# Patient Record
Sex: FEMALE | Race: WHITE | Employment: FULL TIME | URBAN - METROPOLITAN AREA
[De-identification: names, ages, dates, MRNs, and addresses within clinical notes are randomized per-mention and may not be internally consistent; named-entity substitution may affect disease eponyms.]

---

## 2019-09-13 ENCOUNTER — APPOINTMENT (OUTPATIENT)
Dept: GENERAL RADIOLOGY | Age: 59
End: 2019-09-13
Attending: EMERGENCY MEDICINE
Payer: COMMERCIAL

## 2019-09-13 ENCOUNTER — HOSPITAL ENCOUNTER (EMERGENCY)
Age: 59
Discharge: HOME | End: 2019-09-13
Attending: EMERGENCY MEDICINE
Payer: COMMERCIAL

## 2019-09-13 VITALS
HEART RATE: 68 BPM | OXYGEN SATURATION: 99 % | BODY MASS INDEX: 37.27 KG/M2 | RESPIRATION RATE: 11 BRPM | TEMPERATURE: 98.7 F | DIASTOLIC BLOOD PRESSURE: 89 MMHG | WEIGHT: 237.44 LBS | SYSTOLIC BLOOD PRESSURE: 160 MMHG | HEIGHT: 67 IN

## 2019-09-13 DIAGNOSIS — R07.9 ACUTE CHEST PAIN: Primary | ICD-10-CM

## 2019-09-13 LAB
ALBUMIN SERPL-MCNC: 3.7 G/DL (ref 3.5–5)
ALBUMIN/GLOB SERPL: 1 {RATIO} (ref 1.1–2.2)
ALP SERPL-CCNC: 120 U/L (ref 45–117)
ALT SERPL-CCNC: 38 U/L (ref 12–78)
ANION GAP SERPL CALC-SCNC: 12 MMOL/L (ref 5–15)
AST SERPL-CCNC: 21 U/L (ref 15–37)
BASOPHILS # BLD: 0 K/UL (ref 0–0.1)
BASOPHILS NFR BLD: 1 % (ref 0–1)
BILIRUB SERPL-MCNC: 0.3 MG/DL (ref 0.2–1)
BUN SERPL-MCNC: 10 MG/DL (ref 6–20)
BUN/CREAT SERPL: 11 (ref 12–20)
CALCIUM SERPL-MCNC: 9 MG/DL (ref 8.5–10.1)
CHLORIDE SERPL-SCNC: 105 MMOL/L (ref 97–108)
CO2 SERPL-SCNC: 24 MMOL/L (ref 21–32)
COMMENT, HOLDF: NORMAL
CREAT SERPL-MCNC: 0.89 MG/DL (ref 0.55–1.02)
DIFFERENTIAL METHOD BLD: ABNORMAL
EOSINOPHIL # BLD: 0.1 K/UL (ref 0–0.4)
EOSINOPHIL NFR BLD: 2 % (ref 0–7)
ERYTHROCYTE [DISTWIDTH] IN BLOOD BY AUTOMATED COUNT: 14 % (ref 11.5–14.5)
GLOBULIN SER CALC-MCNC: 3.8 G/DL (ref 2–4)
GLUCOSE SERPL-MCNC: 92 MG/DL (ref 65–100)
HCT VFR BLD AUTO: 38.2 % (ref 35–47)
HGB BLD-MCNC: 12.2 G/DL (ref 11.5–16)
IMM GRANULOCYTES # BLD AUTO: 0 K/UL (ref 0–0.04)
IMM GRANULOCYTES NFR BLD AUTO: 1 % (ref 0–0.5)
LYMPHOCYTES # BLD: 1.6 K/UL (ref 0.8–3.5)
LYMPHOCYTES NFR BLD: 24 % (ref 12–49)
MCH RBC QN AUTO: 27.7 PG (ref 26–34)
MCHC RBC AUTO-ENTMCNC: 31.9 G/DL (ref 30–36.5)
MCV RBC AUTO: 86.8 FL (ref 80–99)
MONOCYTES # BLD: 0.5 K/UL (ref 0–1)
MONOCYTES NFR BLD: 7 % (ref 5–13)
NEUTS SEG # BLD: 4.5 K/UL (ref 1.8–8)
NEUTS SEG NFR BLD: 65 % (ref 32–75)
NRBC # BLD: 0 K/UL (ref 0–0.01)
NRBC BLD-RTO: 0 PER 100 WBC
PLATELET # BLD AUTO: 320 K/UL (ref 150–400)
PMV BLD AUTO: 9.9 FL (ref 8.9–12.9)
POTASSIUM SERPL-SCNC: 3.7 MMOL/L (ref 3.5–5.1)
PROT SERPL-MCNC: 7.5 G/DL (ref 6.4–8.2)
RBC # BLD AUTO: 4.4 M/UL (ref 3.8–5.2)
SAMPLES BEING HELD,HOLD: NORMAL
SODIUM SERPL-SCNC: 141 MMOL/L (ref 136–145)
TROPONIN I SERPL-MCNC: <0.05 NG/ML
TROPONIN I SERPL-MCNC: <0.05 NG/ML
WBC # BLD AUTO: 6.7 K/UL (ref 3.6–11)

## 2019-09-13 PROCEDURE — 85025 COMPLETE CBC W/AUTO DIFF WBC: CPT

## 2019-09-13 PROCEDURE — 93005 ELECTROCARDIOGRAM TRACING: CPT

## 2019-09-13 PROCEDURE — 36415 COLL VENOUS BLD VENIPUNCTURE: CPT

## 2019-09-13 PROCEDURE — 71046 X-RAY EXAM CHEST 2 VIEWS: CPT

## 2019-09-13 PROCEDURE — 84484 ASSAY OF TROPONIN QUANT: CPT

## 2019-09-13 PROCEDURE — 80053 COMPREHEN METABOLIC PANEL: CPT

## 2019-09-13 PROCEDURE — 99285 EMERGENCY DEPT VISIT HI MDM: CPT

## 2019-09-13 NOTE — DISCHARGE INSTRUCTIONS
Take a baby aspirin each day. Patient Education        Chest Pain: Care Instructions  Your Care Instructions    There are many things that can cause chest pain. Some are not serious and will get better on their own in a few days. But some kinds of chest pain need more testing and treatment. Your doctor may have recommended a follow-up visit in the next 8 to 12 hours. If you are not getting better, you may need more tests or treatment. Even though your doctor has released you, you still need to watch for any problems. The doctor carefully checked you, but sometimes problems can develop later. If you have new symptoms or if your symptoms do not get better, get medical care right away. If you have worse or different chest pain or pressure that lasts more than 5 minutes or you passed out (lost consciousness), call 911 or seek other emergency help right away. A medical visit is only one step in your treatment. Even if you feel better, you still need to do what your doctor recommends, such as going to all suggested follow-up appointments and taking medicines exactly as directed. This will help you recover and help prevent future problems. How can you care for yourself at home? · Rest until you feel better. · Take your medicine exactly as prescribed. Call your doctor if you think you are having a problem with your medicine. · Do not drive after taking a prescription pain medicine. When should you call for help? Call 911 if:    · You passed out (lost consciousness).     · You have severe difficulty breathing.     · You have symptoms of a heart attack. These may include:  ? Chest pain or pressure, or a strange feeling in your chest.  ? Sweating. ? Shortness of breath. ? Nausea or vomiting. ? Pain, pressure, or a strange feeling in your back, neck, jaw, or upper belly or in one or both shoulders or arms. ? Lightheadedness or sudden weakness. ? A fast or irregular heartbeat.   After you call 911, the  may tell you to chew 1 adult-strength or 2 to 4 low-dose aspirin. Wait for an ambulance. Do not try to drive yourself.    Call your doctor today if:    · You have any trouble breathing.     · Your chest pain gets worse.     · You are dizzy or lightheaded, or you feel like you may faint.     · You are not getting better as expected.     · You are having new or different chest pain. Where can you learn more? Go to http://fabiola-landry.info/. Enter A120 in the search box to learn more about \"Chest Pain: Care Instructions. \"  Current as of: September 23, 2018  Content Version: 12.1  © 1997-5572 [employer]. Care instructions adapted under license by [employer] (which disclaims liability or warranty for this information). If you have questions about a medical condition or this instruction, always ask your healthcare professional. [address] any warranty or liability for your use of this information.

## 2019-09-13 NOTE — ED PROVIDER NOTES
HPI       61-year-old female with a prior history of GERD, hypothyroidism, kidney disease and others here with left upper chest pain. Patient states that 1005 this morning that she had a heaviness to her left upper chest area which was nonradiating. It is associated with some shortness of breath. Her pain is currently 1 out of 10 and much improved. She is never had this pain before. Was associated with sweating but no nausea or vomiting. She does does not exercise regularly. No prior cardiac work-up. She does not have a history of diabetes, hypertension or hypercholesterolemia. There is a family history of a paternal grandmother with congestive heart failure at age 76. Social history: Non-smoker. No alcohol or drug use. Past Medical History:   Diagnosis Date    Endocrine disease     hyperthyroid disease    GERD (gastroesophageal reflux disease)     Other ill-defined conditions(289.89)     kidney stone    Psychiatric disorder     depression       Past Surgical History:   Procedure Laterality Date    HX GYN      HX OTHER SURGICAL      thyroidectomy, displasia          History reviewed. No pertinent family history.     Social History     Socioeconomic History    Marital status:      Spouse name: Not on file    Number of children: Not on file    Years of education: Not on file    Highest education level: Not on file   Occupational History    Not on file   Social Needs    Financial resource strain: Not on file    Food insecurity:     Worry: Not on file     Inability: Not on file    Transportation needs:     Medical: Not on file     Non-medical: Not on file   Tobacco Use    Smoking status: Never Smoker    Smokeless tobacco: Never Used   Substance and Sexual Activity    Alcohol use: No    Drug use: No    Sexual activity: Not on file   Lifestyle    Physical activity:     Days per week: Not on file     Minutes per session: Not on file    Stress: Not on file   Relationships    Social connections:     Talks on phone: Not on file     Gets together: Not on file     Attends Muslim service: Not on file     Active member of club or organization: Not on file     Attends meetings of clubs or organizations: Not on file     Relationship status: Not on file    Intimate partner violence:     Fear of current or ex partner: Not on file     Emotionally abused: Not on file     Physically abused: Not on file     Forced sexual activity: Not on file   Other Topics Concern    Not on file   Social History Narrative    Not on file         ALLERGIES: Erythromycin    Review of Systems   Constitutional: Positive for diaphoresis. Negative for chills and fever. Respiratory: Positive for shortness of breath. Cardiovascular: Positive for chest pain. Gastrointestinal: Negative for abdominal pain, diarrhea, nausea and vomiting. All other systems reviewed and are negative. Vitals:    09/13/19 1137   BP: (!) 175/95   Pulse: 64   Resp: 14   Temp: 98.7 °F (37.1 °C)   SpO2: 99%   Weight: 107.7 kg (237 lb 7 oz)   Height: 5' 7\" (1.702 m)            Physical Exam     Nursing note and vitals reviewed. Constitutional: appears well-developed and well-nourished. No distress. HENT:   Head: Normocephalic and atraumatic. Sclera anicteric  Nose: No rhinorrhea  Mouth/Throat: Oropharynx is clear and moist. Pharynx normal  Eyes: Conjunctivae are normal. Pupils are equal, round, and reactive to light. Right eye exhibits no discharge. Left eye exhibits no discharge. No scleral icterus. Neck: Painless normal range of motion. Supple  Cardiovascular: Normal rate, regular rhythm, normal heart sounds and intact distal pulses. Exam reveals no gallop and no friction rub. No murmur heard. Pulmonary/Chest: Effort normal and breath sounds normal. No respiratory distress. no wheezes. no rales. Abdominal: Soft. Bowel sounds are normal. Exhibits no distension and no mass. No tenderness. No guarding.    Musculoskeletal: Normal range of motion. no tenderness. No edema  Lymphadenopathy:   No cervical adenopathy. Neurological:  Alert and oriented to person, place, and time. Moving all extremities. Skin: Skin is warm and dry. No rash noted. No pallor. MDM          70-year-old female here with chest pain. EKG nonischemic. No cardiac risk factors except family history with congestive heart failure. Chest pain nearly resolved. Will check chest x-ray, labs. Procedures        ED EKG interpretation:  Rhythm: normal sinus rhythm; and regular . Rate (approx.): 69; Axis: left axis deviation; P wave: normal; QRS interval: normal ; ST/T wave: normal; . This EKG was interpreted by Camila Wadsworth MD,ED Provider. ED EKG interpretation: REPEAT AT 1229  Rhythm: normal sinus rhythm; and regular . Rate (approx.): 63; Axis: left axis deviation; P wave: normal; QRS interval: normal ; ST/T wave: normal; . This EKG was interpreted by Camila Wadsworth MD,ED Provider. 3:25 PM  REMAINS CP FREE. FEELS BETTER.  2 TROPONINS ARE NEGATIVE. D/W DR. Humaira Meza, CARDIOLOGY, AND HE AGREES WITH OUTPT F/U EARLY NEXT WEEK. HE WILL SEND A NOTE TO THE OFFICE.      3:26 PM  Patient's results have been reviewed with them. Patient and/or family have verbally conveyed their understanding and agreement of the patient's signs, symptoms, diagnosis, treatment and prognosis and additionally agree to follow up as recommended or return to the Emergency Room should their condition change prior to follow-up. Discharge instructions have also been provided to the patient with some educational information regarding their diagnosis as well a list of reasons why they would want to return to the ER prior to their follow-up appointment should their condition change.     Recent Results (from the past 24 hour(s))   EKG, 12 LEAD, INITIAL    Collection Time: 09/13/19 11:37 AM   Result Value Ref Range    Ventricular Rate 69 BPM    Atrial Rate 69 BPM    P-R Interval 160 ms    QRS Duration 100 ms    Q-T Interval 414 ms    QTC Calculation (Bezet) 443 ms    Calculated R Axis -13 degrees    Calculated T Axis 22 degrees    Diagnosis       Normal sinus rhythm  Septal infarct , age undetermined  Abnormal ECG  No previous ECGs available     SAMPLES BEING HELD    Collection Time: 09/13/19 11:46 AM   Result Value Ref Range    SAMPLES BEING HELD 1LAV,1PST,1RED,1BLU     COMMENT        Add-on orders for these samples will be processed based on acceptable specimen integrity and analyte stability, which may vary by analyte. CBC WITH AUTOMATED DIFF    Collection Time: 09/13/19 11:46 AM   Result Value Ref Range    WBC 6.7 3.6 - 11.0 K/uL    RBC 4.40 3.80 - 5.20 M/uL    HGB 12.2 11.5 - 16.0 g/dL    HCT 38.2 35.0 - 47.0 %    MCV 86.8 80.0 - 99.0 FL    MCH 27.7 26.0 - 34.0 PG    MCHC 31.9 30.0 - 36.5 g/dL    RDW 14.0 11.5 - 14.5 %    PLATELET 242 045 - 458 K/uL    MPV 9.9 8.9 - 12.9 FL    NRBC 0.0 0  WBC    ABSOLUTE NRBC 0.00 0.00 - 0.01 K/uL    NEUTROPHILS 65 32 - 75 %    LYMPHOCYTES 24 12 - 49 %    MONOCYTES 7 5 - 13 %    EOSINOPHILS 2 0 - 7 %    BASOPHILS 1 0 - 1 %    IMMATURE GRANULOCYTES 1 (H) 0.0 - 0.5 %    ABS. NEUTROPHILS 4.5 1.8 - 8.0 K/UL    ABS. LYMPHOCYTES 1.6 0.8 - 3.5 K/UL    ABS. MONOCYTES 0.5 0.0 - 1.0 K/UL    ABS. EOSINOPHILS 0.1 0.0 - 0.4 K/UL    ABS. BASOPHILS 0.0 0.0 - 0.1 K/UL    ABS. IMM.  GRANS. 0.0 0.00 - 0.04 K/UL    DF AUTOMATED     METABOLIC PANEL, COMPREHENSIVE    Collection Time: 09/13/19 11:46 AM   Result Value Ref Range    Sodium 141 136 - 145 mmol/L    Potassium 3.7 3.5 - 5.1 mmol/L    Chloride 105 97 - 108 mmol/L    CO2 24 21 - 32 mmol/L    Anion gap 12 5 - 15 mmol/L    Glucose 92 65 - 100 mg/dL    BUN 10 6 - 20 MG/DL    Creatinine 0.89 0.55 - 1.02 MG/DL    BUN/Creatinine ratio 11 (L) 12 - 20      GFR est AA >60 >60 ml/min/1.73m2    GFR est non-AA >60 >60 ml/min/1.73m2    Calcium 9.0 8.5 - 10.1 MG/DL    Bilirubin, total 0.3 0.2 - 1.0 MG/DL    ALT (SGPT) 38 12 - 78 U/L    AST (SGOT) 21 15 - 37 U/L    Alk. phosphatase 120 (H) 45 - 117 U/L    Protein, total 7.5 6.4 - 8.2 g/dL    Albumin 3.7 3.5 - 5.0 g/dL    Globulin 3.8 2.0 - 4.0 g/dL    A-G Ratio 1.0 (L) 1.1 - 2.2     TROPONIN I    Collection Time: 09/13/19 11:46 AM   Result Value Ref Range    Troponin-I, Qt. <0.05 <0.05 ng/mL   EKG, 12 LEAD, INITIAL    Collection Time: 09/13/19 12:29 PM   Result Value Ref Range    Ventricular Rate 63 BPM    Atrial Rate 63 BPM    P-R Interval 158 ms    QRS Duration 96 ms    Q-T Interval 444 ms    QTC Calculation (Bezet) 454 ms    Calculated P Axis 13 degrees    Calculated R Axis -11 degrees    Calculated T Axis 14 degrees    Diagnosis       Normal sinus rhythm  Septal infarct , age undetermined  Abnormal ECG  When compared with ECG of 13-SEP-2019 11:37,  MANUAL COMPARISON REQUIRED, DATA IS UNCONFIRMED     TROPONIN I    Collection Time: 09/13/19  2:12 PM   Result Value Ref Range    Troponin-I, Qt. <0.05 <0.05 ng/mL       Xr Chest Pa Lat    Result Date: 9/13/2019  Exam:  2 view chest Indication: Chest pain. COMPARISON: None PA and lateral views demonstrate normal heart size. The patient is on a cardiac monitor. There is no acute process in the lung fields. The osseous structures are unremarkable. Impression: No acute process.

## 2019-09-13 NOTE — ED TRIAGE NOTES
Triage Note: Pt arrives via EMS from work for chest pain that started this morning while at work. EMS report NSR on monitor, 325mg of aspirin given, states that reduced pain from an 8 to and 3/10. Reports an increase in stress with her father passing recently. Pain is worse when taking a deep breath.

## 2019-09-15 LAB
ATRIAL RATE: 63 BPM
ATRIAL RATE: 69 BPM
CALCULATED P AXIS, ECG09: 13 DEGREES
CALCULATED R AXIS, ECG10: -11 DEGREES
CALCULATED R AXIS, ECG10: -13 DEGREES
CALCULATED T AXIS, ECG11: 14 DEGREES
CALCULATED T AXIS, ECG11: 22 DEGREES
DIAGNOSIS, 93000: NORMAL
DIAGNOSIS, 93000: NORMAL
P-R INTERVAL, ECG05: 158 MS
P-R INTERVAL, ECG05: 160 MS
Q-T INTERVAL, ECG07: 414 MS
Q-T INTERVAL, ECG07: 444 MS
QRS DURATION, ECG06: 100 MS
QRS DURATION, ECG06: 96 MS
QTC CALCULATION (BEZET), ECG08: 443 MS
QTC CALCULATION (BEZET), ECG08: 454 MS
VENTRICULAR RATE, ECG03: 63 BPM
VENTRICULAR RATE, ECG03: 69 BPM

## 2019-09-18 ENCOUNTER — OFFICE VISIT (OUTPATIENT)
Dept: CARDIOLOGY CLINIC | Age: 59
End: 2019-09-18

## 2019-09-18 VITALS
WEIGHT: 237.6 LBS | HEIGHT: 67 IN | SYSTOLIC BLOOD PRESSURE: 116 MMHG | DIASTOLIC BLOOD PRESSURE: 64 MMHG | BODY MASS INDEX: 37.29 KG/M2 | HEART RATE: 75 BPM | OXYGEN SATURATION: 97 % | RESPIRATION RATE: 16 BRPM

## 2019-09-18 DIAGNOSIS — R06.83 SNORING: ICD-10-CM

## 2019-09-18 DIAGNOSIS — E66.01 SEVERE OBESITY (HCC): ICD-10-CM

## 2019-09-18 DIAGNOSIS — R07.9 CHEST PAIN, UNSPECIFIED TYPE: Primary | ICD-10-CM

## 2019-09-18 PROBLEM — R07.2 PRECORDIAL PAIN: Status: ACTIVE | Noted: 2019-09-13

## 2019-09-18 PROBLEM — R53.83 OTHER FATIGUE: Status: ACTIVE | Noted: 2019-09-18

## 2019-09-18 RX ORDER — LANOLIN ALCOHOL/MO/W.PET/CERES
CREAM (GRAM) TOPICAL
COMMUNITY

## 2019-09-18 NOTE — PROGRESS NOTES
CardioVascular Consult    Patient: Waqar Lazo MRN: [id number]  SSN: [id number]    YOB: 1960  Age: 66 y.o. Sex: female       Subjective:      Primary Care Provider: Tracy English MD      Waqar Lazo is a 66 y.o.  female who presents for assessment of chest pain. This consultation was requested by Dr. Jayson Leahy from [facility]. The patient had just arrived to work on 9//13/2019 when she developed mid-sternal chest pain and shortness of breath. Symptoms were enough to make her tearful, and she called 911. Mild nausea, no vomiting. Mild dizziness. No syncope. Symptoms started at 8/10 and were 3/10 in ER. Evaluaton in ER did not show MI. Since discharge, she has had mild intermittent chest pressure. She does report feeling tired all the time. She also noted dyspnea with exertion, especially steps. 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 She also reports joint pain- by the end of the day she can barely make it to her car. She has had some grumbling in her stomach and initially thought this was her stomach; however, pain was more severe than usual. She is under a lot of stress at work. Hours have been cut and are highly variable. Last thyroid check 2 months ago. Planning for [facility]  next month for abnormal bleeding. Last EGD and colonoscopy 1 year ago- PPI increased.       Past Medical History:   Diagnosis Date    Endocrine disease     hyperthyroid disease    GERD (gastroesophageal reflux disease)     EGD and colonoscopy 2018    Other ill-defined conditions(799.89)     kidney stone    Psychiatric disorder     depression    Thyroid disease       Past Surgical History:   Procedure Laterality Date    CKC, AKA COLD KNIFE CONE  1989    HX GYN      HX OTHER SURGICAL      thyroidectomy, displasia     HX PARTIAL THYROIDECTOMY  1992     Family History   Problem Relation Age of Onset    Hypertension Mother     No Known Problems Father       Social History Tobacco Use    Smoking status: Never Smoker    Smokeless tobacco: Never Used   Substance Use Topics    Alcohol use: No      Current Outpatient Medications   Medication Sig    ferrous sulfate (IRON) 325 mg (65 mg iron) tablet Take  by mouth Daily (before breakfast).  vuwtfoem-voyd-tzp7-C-cristina-bosw (OSTEO BI-FLEX TRIPLE STRENGTH) 750 mg-644 mg- 30 mg-1 mg tab Take  by mouth. Indications: osteo bi flex    lansoprazole (PREVACID) 30 mg capsule Take 30 mg by mouth Daily (before breakfast).  FLUoxetine (PROZAC) 40 mg capsule Take 40 mg by mouth daily.  fexofenadine (ALLEGRA) 180 mg tablet Take 180 mg by mouth daily.  FLUTICASONE PROPIONATE (FLUTICASONE NA) 2 Sprays by Nasal route.  levothyroxine (SYNTHROID) 100 mcg tablet Take 100 mcg by mouth daily (before breakfast). No current facility-administered medications for this visit. Allergies   Allergen Reactions    Erythromycin Hives        Review of Symptoms:  Constitutional: No fevers or chills. HEET: No trauma. No visual changes. No hearing loss. No sore throat. Neck: No adenopathy or swelling. Sharp pain on right side. Heart: Chest pain. Lungs: No shortness of breath. No cough. Abdomen: No pain. No nausea of vomiting. No BRBPR or melena. No diarrhea. Urology: No dysuria or hematuria. Ext: No edema. Right knee swelling. Left arm pain. Skin: No acute rashes or ulcers. Endocrine: No heat or cold intolerance. Neuro: No transient neurologic deficits. Subjective:     Visit Vitals  /64 (BP 1 Location: Left arm, BP Patient Position: Sitting)   Pulse 75   Resp 16   Ht 5' 7\" (1.702 m)   Wt 237 lb 9.6 oz (107.8 kg)   SpO2 97%   BMI 37.21 kg/m²     Physical Exam:  Head: Normocephalic, atraumatic. Eyes: Pupils equal, round, reactive to light and accomodation. , Extra ocular muscles intact. Sclera anicteric. Ears: Grossly responsive to sound. Neck: No adenopathy. No bruits. Throat: No sores or erythema.   Heart: Regular rate and rhythm. Normal S1 and S2. No murmurs, gallops, or rub. Lungs: Clear to auscultation bilaterally. No wheezing, rales, or rhonchi. Abdomen: Soft, non-tender. No guarding or rebound. No hepatosplenomegaly. Bowel sounds active. Ext: No edema. No ulceration. Skin: Normal coloration. Warm and dry. No rash. Neuro: Cranial nerves II through XII intact. Motor and sensory grossly intact. Affect: Appropriate. Alert and interactive. Cardiographics:  ECG: normal EKG, normal sinus rhythm, unchanged from previous tracings         Assessment/Plan        ICD-10-CM ICD-9-CM    1. Chest pain, unspecified type R07.9 786.50 NUCLEAR CARDIAC STRESS TEST      REFERRAL TO SLEEP STUDIES      LIPID PANEL      SED RATE (ESR)      JM BY MULTIPLEX FLOW IA, QL      RHEUMATOID FACTOR, QL   2. Severe obesity (HCC) E66.01 278.01 NUCLEAR CARDIAC STRESS TEST      REFERRAL TO SLEEP STUDIES      LIPID PANEL      SED RATE (ESR)      JM BY MULTIPLEX FLOW IA, QL      RHEUMATOID FACTOR, QL   3. Snoring R06.83 786.09 NUCLEAR CARDIAC STRESS TEST      REFERRAL TO SLEEP STUDIES      LIPID PANEL      SED RATE (ESR)      JM BY MULTIPLEX FLOW IA, QL      RHEUMATOID FACTOR, QL     Ms. Sixto Carroll is a 29-year-old  female presents for assessment of chest discomfort. The patient has had chronic fatigue and dyspnea on exertion over the last 1 year. The symptoms were of sudden onset and significantly worse than hers GI symptoms prompting evaluation in the emergency room. She also has right neck and left arm discomfort of unclear significance. Fortunately she did not have acute myocardial injury during this episode; however, further assessed with a stress imaging study would be prudent given her risk factors and symptoms, as well as the need for upcoming surgery. She is also noticed significant fatigue risk for sleep apnea. We will plan on proceeding with a sleep study.   Ms. Sixto Carroll has had diffuse arthralgias and myalgias of unclear significance. I will plan on checking an ESR, JM, and RF for the possibility of inflammation arthritis. We will check a lipid panel. BMP and CBC in the emergency room were within acceptable limits, and the patient reports that recently three-part primary care. I will see the patient back to review her stress test with her. I have asked that she report any recurrent symptoms to us immediately, and to down 911 for any unstable symptoms. Voices understanding. Should her cardiac evaluation prove unremarkable she may need reassessment by gastroenterology      Stress imaging study  Sleep study  JM, RA, ESR, lipids  RTC after tests to review.       Jacob Melendez MD  9/18/2019, 10:58 AM    [facility] Office:   [facility] Office:  [address] [place]ardo [facility]  [address]     [address]  P: [phone]    P: [phone]  F: [phone]    F: [phone]

## 2019-09-18 NOTE — PROGRESS NOTES
Darryl Wasserman is a 66 y.o. female     Chief Complaint   Patient presents with    ED Follow-up     Seen at [facility] ER on 9/13/19 for chest pain       Visit Vitals  /64 (BP 1 Location: Left arm, BP Patient Position: Sitting)   Pulse 75   Resp 16   Ht 5' 7\" (1.702 m)   Wt 237 lb 9.6 oz (107.8 kg)   LMP 08/01/2019   SpO2 97%   BMI 37.21 kg/m²       Health Maintenance Due   Topic Date Due    Hepatitis C Screening  1960    DTaP/Tdap/Td series (1 - Tdap) 10/19/1983    PAP AKA CERVICAL CYTOLOGY  10/19/1983    Shingrix Vaccine Age 50> (1 of 2) 10/19/2012    BREAST CANCER SCRN MAMMOGRAM  10/19/2012    FOBT Q 1 YEAR AGE 50-75  10/19/2012    Influenza Age 9 to Adult  08/01/2019       1. Have you been to the ER, urgent care clinic since your last visit? Hospitalized since your last visit? No    2. Have you seen or consulted any other health care providers outside of the [address] [place] [address] since your last visit? Include any pap smears or colon screening.  No

## 2019-09-18 NOTE — PATIENT INSTRUCTIONS
You will be scheduled for nuclear stress testing after your appointment today. Wear comfortable clothing (shorts or pants with a shirt or blouse- no underwire bras) and walking or athletic shoes. Do not eat or drink anything, except water, for at least 2 hours prior to your appointment. Avoid tobacco products for at least 6 hours prior to your test.    Do not eat or drink anything containing caffeine, including but not limited to the following: chocolate, regular and decaffeinated coffee, soft drinks, or tea for at least 24 hours prior to your test.    Do not hold your scheduled medications prior to your test.    Your test will be performed on a 2 day protocol. For a 2 day test, please allow for 2 hours in the office each day. The radioactive isotope used for your testing is different from any of the dyes that are commonly used in x-ray procedures, and is ordered specially for your test. Please call to cancel or reschedule your appointment at least 24 hours prior to your scheduled appointment to avoid being billed for the expensive isotope. Please have your labs drawn    A referral has been sent for you to see [facility] . They will be in contact with you to scheduled an appointment. If you do not receive a call by next week please contact them at [phone].

## 2019-09-27 LAB
ANA SER QL: NEGATIVE
CHOLEST SERPL-MCNC: 196 MG/DL (ref 100–199)
ERYTHROCYTE [SEDIMENTATION RATE] IN BLOOD BY WESTERGREN METHOD: 13 MM/HR (ref 0–40)
HDLC SERPL-MCNC: 50 MG/DL
INTERPRETATION, 910389: NORMAL
LDLC SERPL CALC-MCNC: 123 MG/DL (ref 0–99)
RHEUMATOID FACT SERPL-ACNC: <10 IU/ML (ref 0–13.9)
TRIGL SERPL-MCNC: 115 MG/DL (ref 0–149)
VLDLC SERPL CALC-MCNC: 23 MG/DL (ref 5–40)

## 2019-10-23 ENCOUNTER — TELEPHONE (OUTPATIENT)
Dept: CARDIOLOGY CLINIC | Age: 59
End: 2019-10-23

## 2019-10-23 ENCOUNTER — OFFICE VISIT (OUTPATIENT)
Dept: CARDIOLOGY CLINIC | Age: 59
End: 2019-10-23

## 2019-10-23 VITALS
RESPIRATION RATE: 16 BRPM | HEIGHT: 67 IN | OXYGEN SATURATION: 99 % | HEART RATE: 77 BPM | DIASTOLIC BLOOD PRESSURE: 112 MMHG | SYSTOLIC BLOOD PRESSURE: 190 MMHG | WEIGHT: 234.2 LBS | BODY MASS INDEX: 36.76 KG/M2

## 2019-10-23 DIAGNOSIS — R07.2 PRECORDIAL PAIN: ICD-10-CM

## 2019-10-23 DIAGNOSIS — E78.5 DYSLIPIDEMIA: ICD-10-CM

## 2019-10-23 DIAGNOSIS — R10.13 DYSPEPSIA: ICD-10-CM

## 2019-10-23 DIAGNOSIS — E66.01 SEVERE OBESITY (HCC): ICD-10-CM

## 2019-10-23 DIAGNOSIS — R53.83 OTHER FATIGUE: ICD-10-CM

## 2019-10-23 DIAGNOSIS — R06.83 SNORING: Primary | ICD-10-CM

## 2019-10-23 RX ORDER — ATORVASTATIN CALCIUM 20 MG/1
TABLET, FILM COATED ORAL DAILY
COMMUNITY
End: 2019-10-23 | Stop reason: SDUPTHER

## 2019-10-23 RX ORDER — ATORVASTATIN CALCIUM 20 MG/1
20 TABLET, FILM COATED ORAL DAILY
Qty: 90 TAB | Refills: 3 | Status: SHIPPED | OUTPATIENT
Start: 2019-10-23

## 2019-10-23 NOTE — TELEPHONE ENCOUNTER
Pt returned call. 2 pt identifiers used  Wrap up instructions reviewed and pt verbalized her understanding. She reports she has already picked up her medication & B/P cuff. She reports she will be sending B/P readings in via [employer] & understands to call with any high readings or any other discomforts or concerns. Sleep referral reviewed and number given with instructions for her to call them Friday if she has not heard anything.

## 2019-10-23 NOTE — PROGRESS NOTES
Progress Note    Patient: Wilfrido Toure MRN: [id number]  SSN: [id number]    YOB: 1960  Age: 64 y.o. Sex: female        Subjective:     Ms. Apurva Bernstein is a 65 yo WF with chest pain. The patient presented to the ER 9/13/2019 with chest pain and shortness of breath. She has also noted joint pain and progressive fatigue. Feels better today. Rare chest tightness, but no severe symptoms like in ER. Continues with dyspnea on exertion. Still with fatigue. Thinks blood pressure elevated due to stress driving this morning. Also taking cold medicine last few days- Mucinex DM. Objective:     Vitals:    10/23/19 0852 10/23/19 0907 10/23/19 0931 10/23/19 0936   BP: (!) 190/98 170/90 (!) 190/115 (!) 190/112   Pulse: 76   77   Resp: 16      SpO2: 99%   99%   Weight: 234 lb 3.2 oz (106.2 kg)      Height: 5' 7\" (1.702 m)           Physical Exam:   Head: Normocephalic, atraumatic. Eyes: Pupils equal, round, reactive to light and accomodation, Extra ocular muscles intact. Sclera anicteric. Ears: Grossly responsive to sound. Neck: No adenopathy. No bruits. Throat: No sores or erythema. Heart: Regular rate and rhythm. Normal S1 and S2. No murmurs, gallops, or rub. Lungs: Clear to auscultation bilaterally. No wheezing, rales, or rhonchi. Abdomen: Soft, non-tender. No guarding or rebound. No hepatosplenomegaly. Bowel sounds active. Ext: No edema. No ulceration. Skin: Normal coloration. Warm and dry. No rash. Neuro: Cranial nerves II through XII intact. Motor and sensory grossly intact. Affect: Appropriate. Alert and interactive.           Lab Results   Component Value Date/Time    Sed rate (ESR) 13 09/26/2019 08:11 AM   JM=negative  RF= <10    Lab Results   Component Value Date/Time    Cholesterol, total 196 09/26/2019 08:11 AM    HDL Cholesterol 50 09/26/2019 08:11 AM    LDL, calculated 123 (H) 09/26/2019 08:11 AM    VLDL, calculated 23 09/26/2019 08:11 AM    Triglyceride 115 09/26/2019 08:11 AM Lab Results   Component Value Date/Time    Sodium 141 09/13/2019 11:46 AM    Potassium 3.7 09/13/2019 11:46 AM    Chloride 105 09/13/2019 11:46 AM    CO2 24 09/13/2019 11:46 AM    Anion gap 12 09/13/2019 11:46 AM    Glucose 92 09/13/2019 11:46 AM    BUN 10 09/13/2019 11:46 AM    Creatinine 0.89 09/13/2019 11:46 AM    BUN/Creatinine ratio 11 (L) 09/13/2019 11:46 AM    GFR est AA >60 09/13/2019 11:46 AM    GFR est non-AA >60 09/13/2019 11:46 AM    Calcium 9.0 09/13/2019 11:46 AM    Bilirubin, total 0.3 09/13/2019 11:46 AM    AST (SGOT) 21 09/13/2019 11:46 AM    Alk. phosphatase 120 (H) 09/13/2019 11:46 AM    Protein, total 7.5 09/13/2019 11:46 AM    Albumin 3.7 09/13/2019 11:46 AM    Globulin 3.8 09/13/2019 11:46 AM    A-G Ratio 1.0 (L) 09/13/2019 11:46 AM    ALT (SGPT) 38 09/13/2019 11:46 AM     Lab Results   Component Value Date/Time    WBC 6.7 09/13/2019 11:46 AM    HGB 12.2 09/13/2019 11:46 AM    HCT 38.2 09/13/2019 11:46 AM    PLATELET 683 70/72/3883 11:46 AM    MCV 86.8 09/13/2019 11:46 AM       Nuclear stress 10/17/19: no ischemia or infarction. Normal EF. Assessment/Plan:       ICD-10-CM ICD-9-CM    1. Snoring R06.83 786.09 SLEEP MEDICINE REFERRAL      METABOLIC PANEL, COMPREHENSIVE      LIPID PANEL   2. Severe obesity ([address] E66.01 278.01 SLEEP MEDICINE REFERRAL      METABOLIC PANEL, COMPREHENSIVE      LIPID PANEL   3. Precordial pain R07.2 786.51    4. Dyslipidemia E78.5 272.4    5. Other fatigue R53.83 780.79    6. Dyspepsia R10.13 536.8      Chest pain of unclear etiology. Improving, but still present. Low risk stress test, will pursue a conservative course for now. Elevated LDL- start statin. Lipitor 20 mg. Check L/L 6 weeks. Hold on aspirin due to need for surgery, low risk stress test, and active GI issues. Consider at age 74. No signs of inflammatory arthritis. GI symptoms may be a factor. Increase Prevacid 30 BID for 2 weeks, then back to once per day and reassess.  Consider GI evaluation of symptoms persist.    Blood pressure elevated- will follow for now. Check BP and HR twice a day for next two weeks at home. Stop cold medication. Proceed with sleep study as many of her symptoms may be caused by this. Average cardiovascular risk for upcoming surgery (D&C) with Dr. Ministerio Ocasio  ([phone]). Ok to proceed.     RTC 2 months      Signed By: Mook Callahan MD     October 23, 2019

## 2019-10-23 NOTE — PATIENT INSTRUCTIONS
Start taking Lipitor 20 mg. Every evening. Increase Pravacid to twice a day for two weeks and then return to once a day. If symptoms persist you will need to see a gastroenterologist.  Do CMP & Lipid labs in 2 weeks. Do blood pressure & pulse twice a day for next two weeks and call or send in readings. If any high readings please call office. Do not take any cold medications that have or end with DM as this can raise your blood pressure. If you do not hear from sleep study office by Friday, please call their office to schedule appointment using number provided.

## 2020-03-21 ENCOUNTER — APPOINTMENT (OUTPATIENT)
Dept: CT IMAGING | Age: 60
End: 2020-03-21
Attending: EMERGENCY MEDICINE
Payer: COMMERCIAL

## 2020-03-21 ENCOUNTER — HOSPITAL ENCOUNTER (EMERGENCY)
Age: 60
Discharge: HOME | End: 2020-03-21
Attending: EMERGENCY MEDICINE
Payer: COMMERCIAL

## 2020-03-21 VITALS
RESPIRATION RATE: 22 BRPM | WEIGHT: 232.14 LBS | HEART RATE: 73 BPM | BODY MASS INDEX: 36.44 KG/M2 | HEIGHT: 67 IN | DIASTOLIC BLOOD PRESSURE: 84 MMHG | SYSTOLIC BLOOD PRESSURE: 162 MMHG | TEMPERATURE: 97.5 F | OXYGEN SATURATION: 98 %

## 2020-03-21 DIAGNOSIS — N13.1 HYDRONEPHROSIS DUE TO OBSTRUCTION OF URETER: ICD-10-CM

## 2020-03-21 DIAGNOSIS — N20.0 LEFT RENAL STONE: Primary | ICD-10-CM

## 2020-03-21 DIAGNOSIS — R10.31 ABDOMINAL PAIN, RIGHT LOWER QUADRANT: ICD-10-CM

## 2020-03-21 LAB
ALBUMIN SERPL-MCNC: 4 G/DL (ref 3.5–5)
ALBUMIN/GLOB SERPL: 1 {RATIO} (ref 1.1–2.2)
ALP SERPL-CCNC: 140 U/L (ref 45–117)
ALT SERPL-CCNC: 29 U/L (ref 12–78)
ANION GAP SERPL CALC-SCNC: 4 MMOL/L (ref 5–15)
APPEARANCE UR: ABNORMAL
AST SERPL-CCNC: 16 U/L (ref 15–37)
BACTERIA URNS QL MICRO: NEGATIVE /HPF
BASOPHILS # BLD: 0.1 K/UL (ref 0–0.1)
BASOPHILS NFR BLD: 1 % (ref 0–1)
BILIRUB SERPL-MCNC: 0.3 MG/DL (ref 0.2–1)
BILIRUB UR QL CFM: NEGATIVE
BUN SERPL-MCNC: 13 MG/DL (ref 6–20)
BUN/CREAT SERPL: 13 (ref 12–20)
CALCIUM SERPL-MCNC: 8.9 MG/DL (ref 8.5–10.1)
CHLORIDE SERPL-SCNC: 105 MMOL/L (ref 97–108)
CO2 SERPL-SCNC: 28 MMOL/L (ref 21–32)
COLOR UR: ABNORMAL
CREAT SERPL-MCNC: 1 MG/DL (ref 0.55–1.02)
DIFFERENTIAL METHOD BLD: ABNORMAL
EOSINOPHIL # BLD: 0.2 K/UL (ref 0–0.4)
EOSINOPHIL NFR BLD: 1 % (ref 0–7)
EPITH CASTS URNS QL MICRO: ABNORMAL /LPF
ERYTHROCYTE [DISTWIDTH] IN BLOOD BY AUTOMATED COUNT: 13.9 % (ref 11.5–14.5)
GLOBULIN SER CALC-MCNC: 4 G/DL (ref 2–4)
GLUCOSE SERPL-MCNC: 97 MG/DL (ref 65–100)
GLUCOSE UR STRIP.AUTO-MCNC: NEGATIVE MG/DL
HCT VFR BLD AUTO: 40.5 % (ref 35–47)
HGB BLD-MCNC: 13.1 G/DL (ref 11.5–16)
HGB UR QL STRIP: ABNORMAL
HYALINE CASTS URNS QL MICRO: ABNORMAL /LPF (ref 0–5)
IMM GRANULOCYTES # BLD AUTO: 0.1 K/UL (ref 0–0.04)
IMM GRANULOCYTES NFR BLD AUTO: 1 % (ref 0–0.5)
KETONES UR QL STRIP.AUTO: NEGATIVE MG/DL
LEUKOCYTE ESTERASE UR QL STRIP.AUTO: ABNORMAL
LYMPHOCYTES # BLD: 3 K/UL (ref 0.8–3.5)
LYMPHOCYTES NFR BLD: 27 % (ref 12–49)
MCH RBC QN AUTO: 27.1 PG (ref 26–34)
MCHC RBC AUTO-ENTMCNC: 32.3 G/DL (ref 30–36.5)
MCV RBC AUTO: 83.9 FL (ref 80–99)
MONOCYTES # BLD: 0.8 K/UL (ref 0–1)
MONOCYTES NFR BLD: 8 % (ref 5–13)
NEUTS SEG # BLD: 6.9 K/UL (ref 1.8–8)
NEUTS SEG NFR BLD: 62 % (ref 32–75)
NITRITE UR QL STRIP.AUTO: NEGATIVE
NRBC # BLD: 0 K/UL (ref 0–0.01)
NRBC BLD-RTO: 0 PER 100 WBC
PH UR STRIP: 6 [PH] (ref 5–8)
PLATELET # BLD AUTO: 334 K/UL (ref 150–400)
PMV BLD AUTO: 10.3 FL (ref 8.9–12.9)
POTASSIUM SERPL-SCNC: 3.6 MMOL/L (ref 3.5–5.1)
PROT SERPL-MCNC: 8 G/DL (ref 6.4–8.2)
PROT UR STRIP-MCNC: NEGATIVE MG/DL
RBC # BLD AUTO: 4.83 M/UL (ref 3.8–5.2)
RBC #/AREA URNS HPF: >100 /HPF (ref 0–5)
SODIUM SERPL-SCNC: 137 MMOL/L (ref 136–145)
SP GR UR REFRACTOMETRY: 1.02 (ref 1–1.03)
UA: UC IF INDICATED,UAUC: ABNORMAL
UROBILINOGEN UR QL STRIP.AUTO: 0.2 EU/DL (ref 0.2–1)
WBC # BLD AUTO: 11 K/UL (ref 3.6–11)
WBC URNS QL MICRO: ABNORMAL /HPF (ref 0–4)

## 2020-03-21 PROCEDURE — 96375 TX/PRO/DX INJ NEW DRUG ADDON: CPT

## 2020-03-21 PROCEDURE — 74176 CT ABD & PELVIS W/O CONTRAST: CPT

## 2020-03-21 PROCEDURE — 99284 EMERGENCY DEPT VISIT MOD MDM: CPT

## 2020-03-21 PROCEDURE — 96374 THER/PROPH/DIAG INJ IV PUSH: CPT

## 2020-03-21 PROCEDURE — 87086 URINE CULTURE/COLONY COUNT: CPT

## 2020-03-21 PROCEDURE — 74011250637 HC RX REV CODE- 250/637: Performed by: EMERGENCY MEDICINE

## 2020-03-21 PROCEDURE — 74011250636 HC RX REV CODE- 250/636: Performed by: EMERGENCY MEDICINE

## 2020-03-21 PROCEDURE — 36415 COLL VENOUS BLD VENIPUNCTURE: CPT

## 2020-03-21 PROCEDURE — 81001 URINALYSIS AUTO W/SCOPE: CPT

## 2020-03-21 PROCEDURE — 80053 COMPREHEN METABOLIC PANEL: CPT

## 2020-03-21 PROCEDURE — 85025 COMPLETE CBC W/AUTO DIFF WBC: CPT

## 2020-03-21 RX ORDER — OXYCODONE HYDROCHLORIDE 5 MG/1
5 TABLET ORAL
Qty: 12 TAB | Refills: 0 | Status: SHIPPED | OUTPATIENT
Start: 2020-03-21 | End: 2020-03-24

## 2020-03-21 RX ORDER — KETOROLAC TROMETHAMINE 10 MG/1
10 TABLET, FILM COATED ORAL
Qty: 20 TAB | Refills: 0 | Status: SHIPPED | OUTPATIENT
Start: 2020-03-21 | End: 2020-03-26

## 2020-03-21 RX ORDER — KETOROLAC TROMETHAMINE 30 MG/ML
15 INJECTION, SOLUTION INTRAMUSCULAR; INTRAVENOUS ONCE
Status: COMPLETED | OUTPATIENT
Start: 2020-03-21 | End: 2020-03-21

## 2020-03-21 RX ORDER — TAMSULOSIN HYDROCHLORIDE 0.4 MG/1
0.4 CAPSULE ORAL DAILY
Qty: 15 CAP | Refills: 0 | Status: SHIPPED | OUTPATIENT
Start: 2020-03-21 | End: 2020-04-05

## 2020-03-21 RX ORDER — ACETAMINOPHEN 500 MG
1000 TABLET ORAL ONCE
Status: COMPLETED | OUTPATIENT
Start: 2020-03-21 | End: 2020-03-21

## 2020-03-21 RX ORDER — ONDANSETRON 2 MG/ML
4 INJECTION INTRAMUSCULAR; INTRAVENOUS
Status: DISCONTINUED | OUTPATIENT
Start: 2020-03-21 | End: 2020-03-22 | Stop reason: HOSPADM

## 2020-03-21 RX ADMIN — KETOROLAC TROMETHAMINE 15 MG: 30 INJECTION, SOLUTION INTRAMUSCULAR at 21:32

## 2020-03-21 RX ADMIN — SODIUM CHLORIDE 1000 ML: 900 INJECTION, SOLUTION INTRAVENOUS at 21:32

## 2020-03-21 RX ADMIN — ONDANSETRON 4 MG: 2 INJECTION INTRAMUSCULAR; INTRAVENOUS at 21:31

## 2020-03-21 RX ADMIN — ACETAMINOPHEN 1000 MG: 500 TABLET ORAL at 21:32

## 2020-03-22 NOTE — ED PROVIDER NOTES
EMERGENCY DEPARTMENT HISTORY AND PHYSICAL EXAM      Date: 3/21/2020  Patient Name: Gurjit Li    History of Presenting Illness     Chief Complaint   Patient presents with    Abdominal Pain     lower right abdominal pain and flank pain started about 5pm tonight patient has a history of kidney stones denies blood in her urine and denies difficulty urinating        History Provided By: Patient    HPI: Gurjit Li, 64 y.o. female  With previous medical history of kidney stones, hypothyroidism presenting today with right-sided flank pain. The patient reports that she started having pain a couple of hours ago. She was trying to wait it out at home, but her pain became more severe. She states that it started in the right lower quadrant of her abdomen and then radiates around to the right flank area. She notes that she has had a history of kidney stones and this feels similar. She is been in her usual state of health for the past several days. She denies any fevers, chills, dysuria, hematuria, or abdominal surgery. Exacerbating or alleviating factors. There are no other complaints, changes, or physical findings at this time. PCP: Burak Cespedes MD    No current facility-administered medications on file prior to encounter. Current Outpatient Medications on File Prior to Encounter   Medication Sig Dispense Refill    atorvastatin (LIPITOR) 20 mg tablet Take 1 Tab by mouth daily. 90 Tab 3    ferrous sulfate (IRON) 325 mg (65 mg iron) tablet Take  by mouth Daily (before breakfast).  rinnkxls-nche-thp0-C-cristina-bosw (OSTEO BI-FLEX TRIPLE STRENGTH) 750 mg-644 mg- 30 mg-1 mg tab Take  by mouth. Indications: osteo bi flex      lansoprazole (PREVACID) 30 mg capsule Take 30 mg by mouth two (2) times a day. Twice a day for 2 weeks      FLUoxetine (PROZAC) 40 mg capsule Take 40 mg by mouth daily.  fexofenadine (ALLEGRA) 180 mg tablet Take 180 mg by mouth daily.       FLUTICASONE PROPIONATE (FLUTICASONE NA) 2 Sprays by Nasal route.  levothyroxine (SYNTHROID) 100 mcg tablet Take 100 mcg by mouth daily (before breakfast). Past History     Past Medical History:  Past Medical History:   Diagnosis Date    Endocrine disease     hyperthyroid disease    GERD (gastroesophageal reflux disease)     EGD and colonoscopy 2018    Other ill-defined conditions(799.89)     kidney stone    Psychiatric disorder     depression    Thyroid disease        Past Surgical History:  Past Surgical History:   Procedure Laterality Date    CKC, AKA COLD KNIFE CONE  1989    HX GYN      HX OTHER SURGICAL      thyroidectomy, displasia     HX PARTIAL THYROIDECTOMY  1992       Family History:  Family History   Problem Relation Age of Onset    Hypertension Mother     No Known Problems Father        Social History:  Social History     Tobacco Use    Smoking status: Never Smoker    Smokeless tobacco: Never Used   Substance Use Topics    Alcohol use: No    Drug use: No       Allergies: Allergies   Allergen Reactions    Erythromycin Hives         Review of Systems   Constitutional: No  fever  Skin: No  rash  HEENT: No  nasal congestion  Resp: No cough  CV: No chest pain  GI: No vomiting  : No dysuria  MSK: No joint pain  Neuro: No numbness  Psych: No suicidal      Physical Exam     Patient Vitals for the past 12 hrs:   Temp Pulse Resp BP SpO2   03/21/20 2200  73 22 162/84 98 %   03/21/20 2145  (!) 55 20 154/77 98 %   03/21/20 2100  61 16 185/82 (!) 75 %   03/21/20 2045 97.5 °F (36.4 °C) 65 18 (!) 184/103 100 %     General: alert, mild distress, appears in pain  Eyes: EOMI, normal conjunctiva  ENT: moist mucous membranes. Neck: Active, full ROM of neck. Skin: No rashes. no jaundice              Lungs: Equal chest expansion. no respiratory distress.  clear to auscultation bilaterally No accessory muscle usage  Heart: regular rate     no peripheral edema   2+ radial pulses and DPs bilaterally  Abd:  non distended soft, Tender in the RLQ. No rebound tenderness. No guarding  Back: Full ROM  MSK: Full, active ROM in all 4 extremities. Neuro: Person, Place, Time and Situation; normal speech;   Psych: Cooperative with exam; Appropriate mood and affect             Diagnostic Study Results     Labs -     Recent Results (from the past 12 hour(s))   METABOLIC PANEL, COMPREHENSIVE    Collection Time: 03/21/20  9:04 PM   Result Value Ref Range    Sodium 137 136 - 145 mmol/L    Potassium 3.6 3.5 - 5.1 mmol/L    Chloride 105 97 - 108 mmol/L    CO2 28 21 - 32 mmol/L    Anion gap 4 (L) 5 - 15 mmol/L    Glucose 97 65 - 100 mg/dL    BUN 13 6 - 20 MG/DL    Creatinine 1.00 0.55 - 1.02 MG/DL    BUN/Creatinine ratio 13 12 - 20      GFR est AA >60 >60 ml/min/1.73m2    GFR est non-AA 57 (L) >60 ml/min/1.73m2    Calcium 8.9 8.5 - 10.1 MG/DL    Bilirubin, total 0.3 0.2 - 1.0 MG/DL    ALT (SGPT) 29 12 - 78 U/L    AST (SGOT) 16 15 - 37 U/L    Alk. phosphatase 140 (H) 45 - 117 U/L    Protein, total 8.0 6.4 - 8.2 g/dL    Albumin 4.0 3.5 - 5.0 g/dL    Globulin 4.0 2.0 - 4.0 g/dL    A-G Ratio 1.0 (L) 1.1 - 2.2     CBC WITH AUTOMATED DIFF    Collection Time: 03/21/20  9:04 PM   Result Value Ref Range    WBC 11.0 3.6 - 11.0 K/uL    RBC 4.83 3.80 - 5.20 M/uL    HGB 13.1 11.5 - 16.0 g/dL    HCT 40.5 35.0 - 47.0 %    MCV 83.9 80.0 - 99.0 FL    MCH 27.1 26.0 - 34.0 PG    MCHC 32.3 30.0 - 36.5 g/dL    RDW 13.9 11.5 - 14.5 %    PLATELET 076 777 - 444 K/uL    MPV 10.3 8.9 - 12.9 FL    NRBC 0.0 0  WBC    ABSOLUTE NRBC 0.00 0.00 - 0.01 K/uL    NEUTROPHILS 62 32 - 75 %    LYMPHOCYTES 27 12 - 49 %    MONOCYTES 8 5 - 13 %    EOSINOPHILS 1 0 - 7 %    BASOPHILS 1 0 - 1 %    IMMATURE GRANULOCYTES 1 (H) 0.0 - 0.5 %    ABS. NEUTROPHILS 6.9 1.8 - 8.0 K/UL    ABS. LYMPHOCYTES 3.0 0.8 - 3.5 K/UL    ABS. MONOCYTES 0.8 0.0 - 1.0 K/UL    ABS. EOSINOPHILS 0.2 0.0 - 0.4 K/UL    ABS. BASOPHILS 0.1 0.0 - 0.1 K/UL    ABS. IMM.  GRANS. 0.1 (H) 0.00 - 0.04 K/UL    DF AUTOMATED URINALYSIS W/ REFLEX CULTURE    Collection Time: 03/21/20  9:04 PM   Result Value Ref Range    Color YELLOW/STRAW      Appearance CLOUDY (A) CLEAR      Specific gravity 1.020 1.003 - 1.030      pH (UA) 6.0 5.0 - 8.0      Protein NEGATIVE  NEG mg/dL    Glucose NEGATIVE  NEG mg/dL    Ketone NEGATIVE  NEG mg/dL    Blood LARGE (A) NEG      Urobilinogen 0.2 0.2 - 1.0 EU/dL    Nitrites NEGATIVE  NEG      Leukocyte Esterase SMALL (A) NEG      WBC 5-10 0 - 4 /hpf    RBC >100 (H) 0 - 5 /hpf    Epithelial cells FEW FEW /lpf    Bacteria NEGATIVE  NEG /hpf    UA:UC IF INDICATED URINE CULTURE ORDERED (A) CNI      Hyaline cast 2-5 0 - 5 /lpf   BILIRUBIN, CONFIRM    Collection Time: 03/21/20  9:04 PM   Result Value Ref Range    Bilirubin UA, confirm NEGATIVE  NEG         Radiologic Studies -   CT ABD PELV WO CONT   Final Result   IMPRESSION:      1. 5 mm distal right ureteral calculus causes mild proximal obstructive   uropathy. Its progress can be followed with KUB if clinically indicated. 2. 2.7 cm left ovarian cyst is nonspecific but new since 2013. Recommend pelvic   ultrasound in 1 month. 3. Decreased hepatic steatosis on noncontrast CT. Lateral right hepatic lobe 1.4   cm lesion is new and nonspecific. Recommend nonemergent dedicated hepatic MRI or   CT. CT Results  (Last 48 hours)               03/21/20 2129  CT ABD PELV WO CONT Final result    Impression:  IMPRESSION:       1. 5 mm distal right ureteral calculus causes mild proximal obstructive   uropathy. Its progress can be followed with KUB if clinically indicated. 2. 2.7 cm left ovarian cyst is nonspecific but new since 2013. Recommend pelvic   ultrasound in 1 month. 3. Decreased hepatic steatosis on noncontrast CT. Lateral right hepatic lobe 1.4   cm lesion is new and nonspecific. Recommend nonemergent dedicated hepatic MRI or   CT.        Narrative:  EXAM: CT ABD PELV WO CONT       INDICATION: Right abdominal pain and flank pain started at 1700 hours today.   Previous nephrolithiasis. GERD. Nonspecific oncologic surgery. Hematuria on   urinalysis. COMPARISON: CT abdomen/pelvis on 12/30/2013       CONTRAST:  None. TECHNIQUE:    Thin axial images were obtained through the abdomen and pelvis. Coronal and   sagittal reconstructions were generated. Oral contrast was not administered. CT   dose reduction was achieved through use of a standardized protocol tailored for   this examination and automatic exposure control for dose modulation. The absence of intravenous contrast material reduces the sensitivity for   evaluation of the solid parenchymal organs of the abdomen. FINDINGS:    LUNG BASES: Clear. INCIDENTALLY IMAGED HEART AND MEDIASTINUM: Unremarkable. LIVER: Hypoattenuation in the medial segment 7 of the liver is unchanged. 1.4 cm   Hypoattenuation in lateral segment 7 of the liver is new and nonspecific. Hepatic steatosis is decreased on CT. GALLBLADDER: Unremarkable. SPLEEN: Upper normal size. PANCREAS: No inflammation. ADRENALS: Unremarkable. KIDNEYS/URETERS: 5 mm calculus in the distal right ureter is within a few   centimeters of the UVJ and visible on the  view inferior to the right   sacroiliac joint. Mild right hydroureter and hydronephrosis. 1 and 2 mm calculi in the right kidney. No left nephrolithiasis or   hydronephrosis. STOMACH: Incomplete distention, limited evaluation. SMALL BOWEL: No dilatation or wall thickening. COLON: No dilatation or wall thickening. APPENDIX: Unremarkable. PERITONEUM: No ascites or pneumoperitoneum. RETROPERITONEUM: No lymphadenopathy or aortic aneurysm. REPRODUCTIVE ORGANS: Uterus is not enlarged. Left ovarian cysts measures 2.7 cm,   new since 2013. URINARY BLADDER: Incomplete distention, limited evaluation. BONES: Lumbar spine degenerative disc disease includes multilevel grade 1   retrolistheses. No aggressive bone lesion. ADDITIONAL COMMENTS: Obesity. No hernia. CXR Results  (Last 48 hours)    None          Medical Decision Making   I am the first provider for this patient. I reviewed the vital signs, available nursing notes, past medical history, past surgical history, family history and social history. Records Reviewed: Patient last had visit for kidney stone in our emergency department in 12/5/2011    Provider Notes (Medical Decision Making):     Differential Diagnosis: Renal stone, urinary tract infection, pyelonephritis, electrolyte arrangement    Initial Plan: We will treat the patient with IV Toradol, Tylenol, obtain a CT without contrast of the abdomen, urinalysis, and labs and reassess. The patient certainly does appear uncomfortable, but is in only mild distress. She is hypertensive without other abnormal vital signs. ED Course:   Initial assessment performed. The patients presenting problems have been discussed, and they are in agreement with the care plan formulated and outlined with them. I have encouraged them to ask questions as they arise throughout their visit. ED Course as of Mar 21 2232   Sat Mar 21, 2020   2207 On my interpretation of the patient's laboratory studies unremarkable electrolytes, normal creatinine, urinalysis with no evidence of infection, CBC is unremarkable as well. [NW]   2207 On my interpretation of the patient's CT there is evidence of a left-sided ureteral stone with hydronephrosis. 5 mm. [NW]   6682 Upon reassessment the patient has significant improvement in her pain after ketorolac, acetaminophen. Given this I feel that she is reasonable for discharge and a strategy for oral pain control, with Flomax. I have given her information for the kidney stone hotline. She did have some incidental findings on CT scan and we discussed these, I gave her the report of her CT. Patient is given return precautions and is discharged.     [NW]      ED Course User Index  [NW] Jody Garcia MD More Bhatia MD, am the attending of record for this patient encounter. Dispo: Discharged. The patient has been re-evaluated and is ready for discharge. Reviewed available results with patient. Counseled patient on diagnosis and care plan. Patient has expressed understanding, and all questions have been answered. Patient agrees with plan and agrees to follow up as recommended, or to return to the ED if their symptoms worsen. Discharge instructions have been provided and explained to the patient, along with reasons to return to the ED. PLAN:  Current Discharge Medication List      START taking these medications    Details   ketorolac (TORADOL) 10 mg tablet Take 1 Tab by mouth every six (6) hours as needed for Pain for up to 5 days. Qty: 20 Tab, Refills: 0      oxyCODONE IR (Roxicodone) 5 mg immediate release tablet Take 1 Tab by mouth every six (6) hours as needed for Pain for up to 3 days. Max Daily Amount: 20 mg.  Qty: 12 Tab, Refills: 0    Associated Diagnoses: Left renal stone      tamsulosin (Flomax) 0.4 mg capsule Take 1 Cap by mouth daily for 15 days. Qty: 15 Cap, Refills: 0         1.   2.     Follow-up Information     Follow up With Specialties Details Why Contact Info    Rema Womack MD Urology   [id number]  [phone]          3. Return to ED if worse       Diagnosis     Clinical Impression:   1. Left renal stone    2. Abdominal pain, right lower quadrant    3. Hydronephrosis due to obstruction of ureter        Attestations:    Maurilio Nageotte, MD    Please note that this dictation was completed with iodine, the computer voice recognition software. Quite often unanticipated grammatical, syntax, homophones, and other interpretive errors are inadvertently transcribed by the computer software. Please disregard these errors. Please excuse any errors that have escaped final proofreading. Thank you.

## 2020-03-22 NOTE — DISCHARGE INSTRUCTIONS
Kidney stone [facility] [phone]      IMPRESSION  IMPRESSION:     1. 5 mm distal right ureteral calculus causes mild proximal obstructive  uropathy. Its progress can be followed with KUB if clinically indicated. 2. 2.7 cm left ovarian cyst is nonspecific but new since 2013. Recommend pelvic  ultrasound in 1 month. 3. Decreased hepatic steatosis on noncontrast CT. Lateral right hepatic lobe 1.4  cm lesion is new and nonspecific. Recommend nonemergent dedicated hepatic MRI or  CT.

## 2020-03-22 NOTE — ED NOTES
2055: Assumed patient care. Patient presents to the ED ambulatory complaining of RLQ abdominal pain and right side flank pain. Patient reports that she started having pain a couple of hours ago. Patient reports a  history of kidney stones and this feels similar. Patient denies any fevers, chills, dysuria, or hematuria. Patient does acknowledge nausea. Patient placed on the monitor x3 and provided with her call chavez. 2057Chcate Casey MD at bedside. 2130: Nurse at bedside. Patient medicated and denies any additional needs or concerns at this time. Patient's light dimmed to promote rest.     2210: Registration at bedside. 2224: MD Anamaria Espinal reviewed discharge instructions with the patient. The patient verbalized understanding. Patient discharged home with stable vitals. Patient out of ED ambulatory with discharge instructions in hand. Opportunity for questions and clarification provided. No further complaints noted at this time.

## 2020-03-23 LAB
BACTERIA SPEC CULT: NORMAL
SERVICE CMNT-IMP: NORMAL

## 2022-03-19 PROBLEM — R07.2 PRECORDIAL PAIN: Status: ACTIVE | Noted: 2019-09-13

## 2022-03-19 PROBLEM — E66.01 SEVERE OBESITY (HCC): Status: ACTIVE | Noted: 2019-09-18

## 2022-03-19 PROBLEM — R53.83 OTHER FATIGUE: Status: ACTIVE | Noted: 2019-09-18

## 2022-03-20 PROBLEM — E78.5 DYSLIPIDEMIA: Status: ACTIVE | Noted: 2019-10-23

## 2023-05-11 RX ORDER — LANSOPRAZOLE 30 MG/1
CAPSULE, DELAYED RELEASE ORAL 2 TIMES DAILY
COMMUNITY

## 2023-05-11 RX ORDER — FLUOXETINE HYDROCHLORIDE 40 MG/1
40 CAPSULE ORAL DAILY
COMMUNITY

## 2023-05-11 RX ORDER — FERROUS SULFATE 325(65) MG
TABLET ORAL
COMMUNITY

## 2023-05-11 RX ORDER — LEVOTHYROXINE SODIUM 0.1 MG/1
TABLET ORAL
COMMUNITY

## 2023-05-11 RX ORDER — ATORVASTATIN CALCIUM 20 MG/1
TABLET, FILM COATED ORAL DAILY
COMMUNITY
Start: 2019-10-23

## 2023-05-11 RX ORDER — FEXOFENADINE HCL 180 MG/1
180 TABLET ORAL DAILY
COMMUNITY